# Patient Record
Sex: MALE | Race: WHITE | NOT HISPANIC OR LATINO | URBAN - METROPOLITAN AREA
[De-identification: names, ages, dates, MRNs, and addresses within clinical notes are randomized per-mention and may not be internally consistent; named-entity substitution may affect disease eponyms.]

---

## 2023-04-14 ENCOUNTER — TELEPHONE (OUTPATIENT)
Dept: PRIMARY CARE | Facility: CLINIC | Age: 68
End: 2023-04-14
Payer: COMMERCIAL

## 2023-04-14 NOTE — TELEPHONE ENCOUNTER
Pt. With an ingrown toenail and would like a referral to a podiatrist if possible or if you can see him before it gets bad if you can take care of it would an appt.

## 2023-04-17 DIAGNOSIS — L60.0 INGROWN TOENAIL: ICD-10-CM

## 2023-09-01 DIAGNOSIS — N52.9 ERECTILE DYSFUNCTION, UNSPECIFIED ERECTILE DYSFUNCTION TYPE: ICD-10-CM

## 2023-09-01 RX ORDER — SILDENAFIL CITRATE 20 MG/1
TABLET ORAL
Qty: 60 TABLET | Refills: 11 | Status: SHIPPED | OUTPATIENT
Start: 2023-09-01 | End: 2024-01-26 | Stop reason: ALTCHOICE

## 2023-09-01 RX ORDER — SILDENAFIL CITRATE 20 MG/1
TABLET ORAL
COMMUNITY
End: 2023-09-01 | Stop reason: SDUPTHER

## 2023-09-25 DIAGNOSIS — E78.5 HYPERLIPIDEMIA, UNSPECIFIED HYPERLIPIDEMIA TYPE: ICD-10-CM

## 2023-09-25 RX ORDER — EZETIMIBE 10 MG/1
10 TABLET ORAL DAILY
COMMUNITY
End: 2023-09-25 | Stop reason: SDUPTHER

## 2023-09-26 RX ORDER — EZETIMIBE 10 MG/1
10 TABLET ORAL DAILY
Qty: 90 TABLET | Refills: 3 | Status: SHIPPED | OUTPATIENT
Start: 2023-09-26 | End: 2023-10-19

## 2023-10-18 DIAGNOSIS — E78.5 HYPERLIPIDEMIA, UNSPECIFIED HYPERLIPIDEMIA TYPE: ICD-10-CM

## 2023-10-19 RX ORDER — EZETIMIBE 10 MG/1
10 TABLET ORAL DAILY
Qty: 90 TABLET | Refills: 3 | Status: SHIPPED | OUTPATIENT
Start: 2023-10-19

## 2023-11-08 ENCOUNTER — OFFICE VISIT (OUTPATIENT)
Dept: PRIMARY CARE | Facility: CLINIC | Age: 68
End: 2023-11-08
Payer: COMMERCIAL

## 2023-11-08 VITALS
HEIGHT: 70 IN | SYSTOLIC BLOOD PRESSURE: 130 MMHG | TEMPERATURE: 97.1 F | WEIGHT: 180 LBS | OXYGEN SATURATION: 97 % | HEART RATE: 67 BPM | BODY MASS INDEX: 25.77 KG/M2 | DIASTOLIC BLOOD PRESSURE: 80 MMHG

## 2023-11-08 DIAGNOSIS — L03.032 CELLULITIS OF GREAT TOE OF LEFT FOOT: Primary | ICD-10-CM

## 2023-11-08 PROBLEM — B35.1 ONYCHOMYCOSIS OF TOENAIL: Status: ACTIVE | Noted: 2023-11-08

## 2023-11-08 PROBLEM — D18.01 HEMANGIOMA OF SKIN AND SUBCUTANEOUS TISSUE: Status: ACTIVE | Noted: 2017-04-24

## 2023-11-08 PROBLEM — L81.4 OTHER MELANIN HYPERPIGMENTATION: Status: ACTIVE | Noted: 2017-04-24

## 2023-11-08 PROBLEM — D22.60 MELANOCYTIC NEVI OF UNSPECIFIED UPPER LIMB, INCLUDING SHOULDER: Status: ACTIVE | Noted: 2017-04-24

## 2023-11-08 PROBLEM — N13.8 BPH WITH OBSTRUCTION/LOWER URINARY TRACT SYMPTOMS: Status: ACTIVE | Noted: 2023-11-08

## 2023-11-08 PROBLEM — D22.30 MELANOCYTIC NEVI OF UNSPECIFIED PART OF FACE: Status: ACTIVE | Noted: 2017-04-24

## 2023-11-08 PROBLEM — D22.9 NUMEROUS MOLES: Status: ACTIVE | Noted: 2023-11-08

## 2023-11-08 PROBLEM — N40.1 NOCTURIA ASSOCIATED WITH BENIGN PROSTATIC HYPERPLASIA: Status: ACTIVE | Noted: 2023-11-08

## 2023-11-08 PROBLEM — N40.1 BPH WITH OBSTRUCTION/LOWER URINARY TRACT SYMPTOMS: Status: ACTIVE | Noted: 2023-11-08

## 2023-11-08 PROBLEM — L30.9 DERMATITIS, ECZEMATOID: Status: ACTIVE | Noted: 2023-11-08

## 2023-11-08 PROBLEM — D48.5 NEOPLASM OF UNCERTAIN BEHAVIOR OF SKIN: Status: ACTIVE | Noted: 2017-04-24

## 2023-11-08 PROBLEM — R97.20 ELEVATED PSA: Status: ACTIVE | Noted: 2023-11-08

## 2023-11-08 PROBLEM — R33.9 URINARY RETENTION WITH INCOMPLETE BLADDER EMPTYING: Status: ACTIVE | Noted: 2023-11-08

## 2023-11-08 PROBLEM — N52.9 ED (ERECTILE DYSFUNCTION): Status: ACTIVE | Noted: 2023-11-08

## 2023-11-08 PROBLEM — H93.8X2 PRESSURE SENSATION IN LEFT EAR: Status: ACTIVE | Noted: 2023-11-08

## 2023-11-08 PROBLEM — R10.2 PELVIC PAIN IN MALE: Status: ACTIVE | Noted: 2023-11-08

## 2023-11-08 PROBLEM — R35.1 NOCTURIA ASSOCIATED WITH BENIGN PROSTATIC HYPERPLASIA: Status: ACTIVE | Noted: 2023-11-08

## 2023-11-08 PROBLEM — E78.5 DYSLIPIDEMIA: Status: ACTIVE | Noted: 2023-11-08

## 2023-11-08 PROBLEM — L82.1 OTHER SEBORRHEIC KERATOSIS: Status: ACTIVE | Noted: 2017-04-24

## 2023-11-08 PROBLEM — D22.5 MELANOCYTIC NEVI OF TRUNK: Status: ACTIVE | Noted: 2017-04-24

## 2023-11-08 PROBLEM — L60.3 DYSTROPHIC NAIL: Status: ACTIVE | Noted: 2023-11-08

## 2023-11-08 PROBLEM — K40.90 RIGHT INGUINAL HERNIA: Status: ACTIVE | Noted: 2023-11-08

## 2023-11-08 PROCEDURE — 1159F MED LIST DOCD IN RCRD: CPT | Performed by: INTERNAL MEDICINE

## 2023-11-08 PROCEDURE — 99213 OFFICE O/P EST LOW 20 MIN: CPT | Performed by: INTERNAL MEDICINE

## 2023-11-08 PROCEDURE — 1126F AMNT PAIN NOTED NONE PRSNT: CPT | Performed by: INTERNAL MEDICINE

## 2023-11-08 PROCEDURE — 1160F RVW MEDS BY RX/DR IN RCRD: CPT | Performed by: INTERNAL MEDICINE

## 2023-11-08 RX ORDER — MULTIVITAMIN
1 TABLET ORAL DAILY
COMMUNITY
Start: 2021-01-15

## 2023-11-08 RX ORDER — SULFAMETHOXAZOLE AND TRIMETHOPRIM 800; 160 MG/1; MG/1
1 TABLET ORAL 2 TIMES DAILY
Qty: 14 TABLET | Refills: 0 | Status: SHIPPED | OUTPATIENT
Start: 2023-11-08 | End: 2023-11-15

## 2023-11-08 RX ORDER — TERBINAFINE HYDROCHLORIDE 250 MG/1
250 TABLET ORAL DAILY
COMMUNITY
End: 2024-01-26 | Stop reason: ALTCHOICE

## 2023-11-08 RX ORDER — BIOTIN 1 MG
TABLET ORAL
COMMUNITY
Start: 2021-01-15

## 2023-11-08 RX ORDER — ASPIRIN 81 MG/1
1 TABLET ORAL DAILY
COMMUNITY
Start: 2021-01-25

## 2023-11-08 ASSESSMENT — ENCOUNTER SYMPTOMS
STRIDOR: 0
CHOKING: 0
BACK PAIN: 0
HEADACHES: 0
DIFFICULTY URINATING: 0
SHORTNESS OF BREATH: 0
COUGH: 0
ABDOMINAL PAIN: 0
FACIAL ASYMMETRY: 0
EYE DISCHARGE: 0
NECK STIFFNESS: 0
RHINORRHEA: 0
CONFUSION: 0
COLOR CHANGE: 1
BLOOD IN STOOL: 0
FEVER: 0
SINUS PRESSURE: 0
POLYDIPSIA: 0
HALLUCINATIONS: 0
NERVOUS/ANXIOUS: 0
SPEECH DIFFICULTY: 0
VOICE CHANGE: 0
APPETITE CHANGE: 0
FREQUENCY: 0
WHEEZING: 0
ARTHRALGIAS: 0
SEIZURES: 0
TROUBLE SWALLOWING: 0
BRUISES/BLEEDS EASILY: 0
EYE REDNESS: 0
NAUSEA: 0
CHEST TIGHTNESS: 0
DIZZINESS: 0
PALPITATIONS: 0
FLANK PAIN: 0
CONSTIPATION: 0
ACTIVITY CHANGE: 0
WEAKNESS: 0
PHOTOPHOBIA: 0
NUMBNESS: 0
WOUND: 1
ABDOMINAL DISTENTION: 0
VOMITING: 0
DYSPHORIC MOOD: 0
DIARRHEA: 0
FATIGUE: 0
POLYPHAGIA: 0
DYSURIA: 0
SINUS PAIN: 0
MYALGIAS: 0

## 2023-11-08 ASSESSMENT — PAIN SCALES - GENERAL: PAINLEVEL: 0-NO PAIN

## 2023-11-08 NOTE — PROGRESS NOTES
Subjective   Patient ID: David Caban is a 68 y.o. male who presents for Allergic Reaction (Given amoxicillin for toe infection. C/o allergic reaction-rash across body. Took benadryl twice yesterday).    HPI   Patient recently had left foot Ingrown toe nail removed by Podiatrist and following it patient developed toe infection and given Amoxicillin. He developed allergic rash over the face and neck and its itchy. He was told to take Benadryl 50 mg TID and after 2nd dose he felt very dizzy and presented to the office today.    Review of Systems   Constitutional:  Negative for activity change, appetite change, fatigue and fever.   HENT:  Negative for congestion, dental problem, ear pain, hearing loss, rhinorrhea, sinus pressure, sinus pain, trouble swallowing and voice change.    Eyes:  Negative for photophobia, discharge, redness and visual disturbance.   Respiratory:  Negative for cough, choking, chest tightness, shortness of breath, wheezing and stridor.    Cardiovascular:  Negative for chest pain, palpitations and leg swelling.   Gastrointestinal:  Negative for abdominal distention, abdominal pain, blood in stool, constipation, diarrhea, nausea and vomiting.   Endocrine: Negative for polydipsia, polyphagia and polyuria.   Genitourinary:  Negative for difficulty urinating, dysuria, flank pain, frequency and urgency.   Musculoskeletal:  Negative for arthralgias, back pain, myalgias and neck stiffness.   Skin:  Positive for color change, rash and wound.   Allergic/Immunologic: Negative for immunocompromised state.   Neurological:  Negative for dizziness, seizures, syncope, facial asymmetry, speech difficulty, weakness, numbness and headaches.   Hematological:  Does not bruise/bleed easily.   Psychiatric/Behavioral:  Negative for behavioral problems, confusion, dysphoric mood, hallucinations and suicidal ideas. The patient is not nervous/anxious.      Objective   /80   Pulse 67   Temp 36.2 °C (97.1 °F)   Ht  "1.778 m (5' 10\")   Wt 81.6 kg (180 lb)   SpO2 97%   BMI 25.83 kg/m²     Physical Exam  Constitutional:       General: He is not in acute distress.     Appearance: Normal appearance. He is not ill-appearing or toxic-appearing.   Eyes:      Conjunctiva/sclera: Conjunctivae normal.   Cardiovascular:      Rate and Rhythm: Normal rate and regular rhythm.      Pulses: Normal pulses.      Heart sounds: Normal heart sounds. No murmur heard.  Pulmonary:      Effort: Pulmonary effort is normal.   Musculoskeletal:         General: Swelling and tenderness present. No deformity.      Cervical back: Normal range of motion.      Right lower leg: No edema.      Left lower leg: No edema.   Neurological:      General: No focal deficit present.      Mental Status: He is alert and oriented to person, place, and time.   Psychiatric:         Mood and Affect: Mood normal.         Behavior: Behavior normal.       Assessment/Plan   Problem List Items Addressed This Visit    None  Visit Diagnoses       Cellulitis of great toe of left foot    -  Primary    Relevant Medications    sulfamethoxazole-trimethoprim (Bactrim DS) 800-160 mg tablet        Bactrim has been prescribed but patient told to take Benadryl 25 mg every night for the duration of Bactrim and you can stop Amoxicillin. Patient has been informed that any medication can cause side effects and Bactrim can also cause similar symptoms but will try to switch antibiotic and try to control side effects with Benadryl.  "

## 2024-01-08 ENCOUNTER — TELEPHONE (OUTPATIENT)
Dept: PRIMARY CARE | Facility: CLINIC | Age: 69
End: 2024-01-08
Payer: COMMERCIAL

## 2024-01-08 NOTE — TELEPHONE ENCOUNTER
Per written response from MP I called in Paxlovid full dose and advised the pt to hold his statin and or amlodipine.

## 2024-01-08 NOTE — TELEPHONE ENCOUNTER
Pt called stated that he tested positive for covid last night his sx started on sat night with cough, congestion, headache, and body ache. No fever, no SOB, asking if paxlovid can be called in ?

## 2024-01-26 ENCOUNTER — OFFICE VISIT (OUTPATIENT)
Dept: PRIMARY CARE | Facility: CLINIC | Age: 69
End: 2024-01-26
Payer: COMMERCIAL

## 2024-01-26 ENCOUNTER — LAB (OUTPATIENT)
Dept: LAB | Facility: LAB | Age: 69
End: 2024-01-26
Payer: COMMERCIAL

## 2024-01-26 VITALS
SYSTOLIC BLOOD PRESSURE: 118 MMHG | HEART RATE: 74 BPM | DIASTOLIC BLOOD PRESSURE: 74 MMHG | OXYGEN SATURATION: 95 % | TEMPERATURE: 98 F | BODY MASS INDEX: 26.83 KG/M2 | HEIGHT: 70 IN | RESPIRATION RATE: 16 BRPM | WEIGHT: 187.4 LBS

## 2024-01-26 DIAGNOSIS — N40.1 NOCTURIA ASSOCIATED WITH BENIGN PROSTATIC HYPERPLASIA: ICD-10-CM

## 2024-01-26 DIAGNOSIS — N52.9 ERECTILE DYSFUNCTION, UNSPECIFIED ERECTILE DYSFUNCTION TYPE: ICD-10-CM

## 2024-01-26 DIAGNOSIS — Z78.9 NEVER SMOKED CIGARETTES: ICD-10-CM

## 2024-01-26 DIAGNOSIS — Z12.5 SPECIAL SCREENING FOR MALIGNANT NEOPLASM OF PROSTATE: ICD-10-CM

## 2024-01-26 DIAGNOSIS — N13.8 BPH WITH OBSTRUCTION/LOWER URINARY TRACT SYMPTOMS: ICD-10-CM

## 2024-01-26 DIAGNOSIS — R97.20 ELEVATED PSA: ICD-10-CM

## 2024-01-26 DIAGNOSIS — B35.1 ONYCHOMYCOSIS OF TOENAIL: ICD-10-CM

## 2024-01-26 DIAGNOSIS — N40.1 BPH WITH OBSTRUCTION/LOWER URINARY TRACT SYMPTOMS: ICD-10-CM

## 2024-01-26 DIAGNOSIS — E66.3 OVERWEIGHT WITH BODY MASS INDEX (BMI) OF 26 TO 26.9 IN ADULT: ICD-10-CM

## 2024-01-26 DIAGNOSIS — Z00.00 WELL ADULT EXAM: ICD-10-CM

## 2024-01-26 DIAGNOSIS — Z00.00 WELL ADULT EXAM: Primary | ICD-10-CM

## 2024-01-26 DIAGNOSIS — R35.1 NOCTURIA ASSOCIATED WITH BENIGN PROSTATIC HYPERPLASIA: ICD-10-CM

## 2024-01-26 DIAGNOSIS — E78.5 DYSLIPIDEMIA: ICD-10-CM

## 2024-01-26 PROBLEM — H93.8X2 PRESSURE SENSATION IN LEFT EAR: Status: RESOLVED | Noted: 2023-11-08 | Resolved: 2024-01-26

## 2024-01-26 LAB
ALBUMIN SERPL BCP-MCNC: 4.2 G/DL (ref 3.4–5)
ALP SERPL-CCNC: 77 U/L (ref 33–136)
ALT SERPL W P-5'-P-CCNC: 15 U/L (ref 10–52)
ANION GAP SERPL CALC-SCNC: 12 MMOL/L (ref 10–20)
AST SERPL W P-5'-P-CCNC: 17 U/L (ref 9–39)
BILIRUB SERPL-MCNC: 0.6 MG/DL (ref 0–1.2)
BUN SERPL-MCNC: 18 MG/DL (ref 6–23)
CALCIUM SERPL-MCNC: 9.4 MG/DL (ref 8.6–10.3)
CHLORIDE SERPL-SCNC: 106 MMOL/L (ref 98–107)
CHOLEST SERPL-MCNC: 144 MG/DL (ref 0–199)
CHOLESTEROL/HDL RATIO: 2.6
CO2 SERPL-SCNC: 26 MMOL/L (ref 21–32)
CREAT SERPL-MCNC: 0.85 MG/DL (ref 0.5–1.3)
EGFRCR SERPLBLD CKD-EPI 2021: >90 ML/MIN/1.73M*2
ERYTHROCYTE [DISTWIDTH] IN BLOOD BY AUTOMATED COUNT: 12.9 % (ref 11.5–14.5)
GLUCOSE SERPL-MCNC: 83 MG/DL (ref 74–99)
HCT VFR BLD AUTO: 40.9 % (ref 41–52)
HDLC SERPL-MCNC: 55.8 MG/DL
HGB BLD-MCNC: 13.6 G/DL (ref 13.5–17.5)
LDLC SERPL CALC-MCNC: 75 MG/DL
MCH RBC QN AUTO: 30.2 PG (ref 26–34)
MCHC RBC AUTO-ENTMCNC: 33.3 G/DL (ref 32–36)
MCV RBC AUTO: 91 FL (ref 80–100)
NON HDL CHOLESTEROL: 88 MG/DL (ref 0–149)
NRBC BLD-RTO: 0 /100 WBCS (ref 0–0)
PLATELET # BLD AUTO: 264 X10*3/UL (ref 150–450)
POTASSIUM SERPL-SCNC: 4.3 MMOL/L (ref 3.5–5.3)
PROT SERPL-MCNC: 6.6 G/DL (ref 6.4–8.2)
RBC # BLD AUTO: 4.51 X10*6/UL (ref 4.5–5.9)
SODIUM SERPL-SCNC: 140 MMOL/L (ref 136–145)
TRIGL SERPL-MCNC: 65 MG/DL (ref 0–149)
VLDL: 13 MG/DL (ref 0–40)
WBC # BLD AUTO: 5 X10*3/UL (ref 4.4–11.3)

## 2024-01-26 PROCEDURE — 80053 COMPREHEN METABOLIC PANEL: CPT

## 2024-01-26 PROCEDURE — 36415 COLL VENOUS BLD VENIPUNCTURE: CPT

## 2024-01-26 PROCEDURE — 99397 PER PM REEVAL EST PAT 65+ YR: CPT | Performed by: FAMILY MEDICINE

## 2024-01-26 PROCEDURE — 84153 ASSAY OF PSA TOTAL: CPT

## 2024-01-26 PROCEDURE — 1126F AMNT PAIN NOTED NONE PRSNT: CPT | Performed by: FAMILY MEDICINE

## 2024-01-26 PROCEDURE — 1036F TOBACCO NON-USER: CPT | Performed by: FAMILY MEDICINE

## 2024-01-26 PROCEDURE — 1159F MED LIST DOCD IN RCRD: CPT | Performed by: FAMILY MEDICINE

## 2024-01-26 PROCEDURE — 80061 LIPID PANEL: CPT

## 2024-01-26 PROCEDURE — 3008F BODY MASS INDEX DOCD: CPT | Performed by: FAMILY MEDICINE

## 2024-01-26 PROCEDURE — 85027 COMPLETE CBC AUTOMATED: CPT

## 2024-01-26 PROCEDURE — 1157F ADVNC CARE PLAN IN RCRD: CPT | Performed by: FAMILY MEDICINE

## 2024-01-26 RX ORDER — TADALAFIL 5 MG/1
5 TABLET ORAL DAILY
Qty: 90 TABLET | Refills: 3 | Status: SHIPPED | OUTPATIENT
Start: 2024-01-26 | End: 2025-01-25

## 2024-01-26 RX ORDER — TADALAFIL 5 MG/1
5 TABLET ORAL DAILY
Qty: 90 TABLET | Refills: 3 | Status: SHIPPED | OUTPATIENT
Start: 2024-01-26 | End: 2024-01-26 | Stop reason: SDUPTHER

## 2024-01-26 ASSESSMENT — ENCOUNTER SYMPTOMS
DYSURIA: 0
BLOOD IN STOOL: 0
BACK PAIN: 0
PALPITATIONS: 0
CHILLS: 0
LOSS OF SENSATION IN FEET: 0
EYE PAIN: 0
BRUISES/BLEEDS EASILY: 0
SHORTNESS OF BREATH: 0
ADENOPATHY: 0
HEMATURIA: 0
FEVER: 0
EYE REDNESS: 0
WEAKNESS: 0
COUGH: 0
ABDOMINAL PAIN: 0
OCCASIONAL FEELINGS OF UNSTEADINESS: 0
RHINORRHEA: 0
NECK STIFFNESS: 0
HALLUCINATIONS: 0
WOUND: 0
POLYDIPSIA: 0
HEADACHES: 0
DEPRESSION: 0
SORE THROAT: 0
FATIGUE: 0

## 2024-01-26 ASSESSMENT — PATIENT HEALTH QUESTIONNAIRE - PHQ9
2. FEELING DOWN, DEPRESSED OR HOPELESS: NOT AT ALL
1. LITTLE INTEREST OR PLEASURE IN DOING THINGS: NOT AT ALL
SUM OF ALL RESPONSES TO PHQ9 QUESTIONS 1 AND 2: 0

## 2024-01-26 NOTE — PROGRESS NOTES
David Caban is a 68 y.o. male with Chief Complaint of Annual Exam (CPE).  And recheck on HLD and hig psa.   Past Surgical History:   Procedure Laterality Date    OTHER SURGICAL HISTORY  07/07/2015    Wrist Surgery      Social History     Socioeconomic History    Marital status:      Spouse name: Not on file    Number of children: Not on file    Years of education: Not on file    Highest education level: Not on file   Occupational History    Not on file   Tobacco Use    Smoking status: Never     Passive exposure: Never    Smokeless tobacco: Never   Vaping Use    Vaping Use: Never used   Substance and Sexual Activity    Alcohol use: Yes     Comment: social    Drug use: Never    Sexual activity: Yes     Partners: Female   Other Topics Concern    Not on file   Social History Narrative    Not on file     Social Determinants of Health     Financial Resource Strain: Not on file   Food Insecurity: Not on file   Transportation Needs: Not on file   Physical Activity: Not on file   Stress: Not on file   Social Connections: Not on file   Intimate Partner Violence: Not on file   Housing Stability: Not on file     Past Medical History:   Diagnosis Date    Onychomycosis of toenail 11/08/2023    Other specified symptoms and signs involving the circulatory and respiratory systems     Carotid bruit    Personal history of other diseases of the musculoskeletal system and connective tissue 02/28/2018    History of back pain    Polyp of colon 07/18/2017    Sessile colonic polyp    Unspecified abdominal pain 02/28/2018    Acute left flank pain      No family history on file.     Review of Systems   Constitutional:  Negative for chills, fatigue and fever.   HENT:  Negative for rhinorrhea and sore throat.    Eyes:  Negative for pain and redness.   Respiratory:  Negative for cough and shortness of breath.    Cardiovascular:  Negative for chest pain and palpitations.   Gastrointestinal:  Negative for abdominal pain and blood in  "stool.   Endocrine: Negative for polydipsia and polyuria.   Genitourinary:  Negative for dysuria and hematuria.   Musculoskeletal:  Negative for back pain and neck stiffness.   Skin:  Negative for rash and wound.   Allergic/Immunologic: Negative for environmental allergies and food allergies.   Neurological:  Negative for weakness and headaches.   Hematological:  Negative for adenopathy. Does not bruise/bleed easily.   Psychiatric/Behavioral:  Negative for hallucinations and suicidal ideas.       /74 (BP Location: Left arm, Patient Position: Sitting, BP Cuff Size: Adult)   Pulse 74   Temp 36.7 °C (98 °F) (Temporal)   Resp 16   Ht 1.778 m (5' 10\")   Wt 85 kg (187 lb 6.4 oz)   SpO2 95%   BMI 26.89 kg/m²   Physical Exam  Vitals reviewed.   Constitutional:       General: He is not in acute distress.     Appearance: He is not ill-appearing.   HENT:      Head: Normocephalic and atraumatic.      Right Ear: Tympanic membrane normal.      Left Ear: Tympanic membrane normal.      Nose: No congestion or rhinorrhea.      Mouth/Throat:      Pharynx: No oropharyngeal exudate or posterior oropharyngeal erythema.   Eyes:      Extraocular Movements: Extraocular movements intact.      Conjunctiva/sclera: Conjunctivae normal.      Pupils: Pupils are equal, round, and reactive to light.   Cardiovascular:      Rate and Rhythm: Normal rate and regular rhythm.      Heart sounds: No murmur heard.     No friction rub. No gallop.   Pulmonary:      Effort: Pulmonary effort is normal.      Breath sounds: Normal breath sounds. No wheezing, rhonchi or rales.   Abdominal:      General: There is no distension.      Palpations: Abdomen is soft.      Tenderness: There is no abdominal tenderness. There is no guarding or rebound.   Musculoskeletal:         General: No swelling or deformity.      Cervical back: Normal range of motion and neck supple.      Right lower leg: No edema.      Left lower leg: No edema.   Skin:     Capillary " "Refill: Capillary refill takes less than 2 seconds.      Coloration: Skin is not jaundiced.      Findings: No rash.   Neurological:      General: No focal deficit present.      Mental Status: He is alert.      Motor: No weakness.   Psychiatric:         Mood and Affect: Mood normal.         Behavior: Behavior normal.       Lab Results   Component Value Date    WBC 4.1 (L) 01/25/2023    HGB 14.1 01/25/2023    HCT 42.4 01/25/2023    MCV 91 01/25/2023     01/25/2023     Lab Results   Component Value Date    CHOL 169 01/25/2023    CHOL 171 01/21/2022    CHOL 145 06/18/2021     Lab Results   Component Value Date    HDL 47.0 01/25/2023    HDL 50.7 01/21/2022    HDL 59.5 06/18/2021     No results found for: \"LDLCALC\"  Lab Results   Component Value Date    TRIG 197 (H) 01/21/2022    TRIG 50 01/10/2020     No components found for: \"CHOLHDL\"  No results found for: \"HGBA1C\"    Assessment/Plan   Problem List Items Addressed This Visit       BPH with obstruction/lower urinary tract symptoms    Current Assessment & Plan     Trial cialis 5 mg daily -- to help with ED and BPH symptoms.          Dyslipidemia    Overview     Johnyn's Risk 6.7% but CACS 42 (showing small plaque in LAD and LCx).          Current Assessment & Plan     Continue zetia. Did NOT tolerate statins.          ED (erectile dysfunction)    Current Assessment & Plan     Trial cialis 5 mg daily -- to help with ED and BPH symptoms.          Elevated PSA    Overview     negative MRI 2021 per Dr Kirby.             Current Assessment & Plan     PSA 4 2022.  Continue to monitor.          Nocturia associated with benign prostatic hyperplasia    Well adult exam - Primary    Overview     Preventative exam performed 1/26/24           Current Assessment & Plan     1/16/24 Preventative exam -- continue healthy diet and exercise. Check bloodwork.     Colonoscopy 2020 showed 4 and 8 mm polyps -- recheck due in 2025.   Tdap up to date -- next due 2025. Zostavax 7/18/17. "   Shingrix 2020 x 2.     Weight loss tips provided for BM > 25 c/w healthy physique with high lean muscle mass. Encourage regular exercise and healthy diet. Offer prevnar 20 1/2023. Feels good on chinese herbal medicine.    # NO carotid bruit -- referred vibratory hum from heart murmur.     # Hx 2/6 heart murmur -- likely venous hum -- normal stress echo 2008. No cardiac symptoms.    # Mild anemia on exam 7/2015 -- normal b12 and ferritin. Patient donates blood every 6 weeks and has never been refused due to low hematocrit.         RESOLVED: Onychomycosis of toenail    Current Assessment & Plan     completed trial of terbinafine 250 mg daily x 180 day.           Other Visit Diagnoses       Overweight with body mass index (BMI) of 26 to 26.9 in adult        Never smoked cigarettes        Special screening for malignant neoplasm of prostate

## 2024-01-27 LAB — PSA SERPL-MCNC: 3.68 NG/ML

## 2024-03-01 ENCOUNTER — APPOINTMENT (OUTPATIENT)
Dept: UROLOGY | Facility: CLINIC | Age: 69
End: 2024-03-01
Payer: COMMERCIAL

## 2024-03-19 ENCOUNTER — APPOINTMENT (OUTPATIENT)
Dept: UROLOGY | Facility: CLINIC | Age: 69
End: 2024-03-19
Payer: COMMERCIAL

## 2024-11-01 DIAGNOSIS — E78.5 HYPERLIPIDEMIA, UNSPECIFIED HYPERLIPIDEMIA TYPE: ICD-10-CM

## 2024-11-01 RX ORDER — EZETIMIBE 10 MG/1
10 TABLET ORAL DAILY
Qty: 30 TABLET | Refills: 11 | Status: SHIPPED | OUTPATIENT
Start: 2024-11-01

## 2025-02-21 DIAGNOSIS — N13.8 BPH WITH OBSTRUCTION/LOWER URINARY TRACT SYMPTOMS: ICD-10-CM

## 2025-02-21 DIAGNOSIS — N52.9 ERECTILE DYSFUNCTION, UNSPECIFIED ERECTILE DYSFUNCTION TYPE: ICD-10-CM

## 2025-02-21 DIAGNOSIS — N40.1 BPH WITH OBSTRUCTION/LOWER URINARY TRACT SYMPTOMS: ICD-10-CM

## 2025-02-24 RX ORDER — TADALAFIL 5 MG/1
5 TABLET ORAL DAILY
Qty: 90 TABLET | Refills: 3 | Status: SHIPPED | OUTPATIENT
Start: 2025-02-24 | End: 2026-02-24

## 2025-04-02 ENCOUNTER — APPOINTMENT (OUTPATIENT)
Dept: PRIMARY CARE | Facility: CLINIC | Age: 70
End: 2025-04-02
Payer: COMMERCIAL

## 2025-05-02 ENCOUNTER — HOSPITAL ENCOUNTER (EMERGENCY)
Facility: HOSPITAL | Age: 70
Discharge: HOME | End: 2025-05-02
Payer: COMMERCIAL

## 2025-05-02 VITALS
BODY MASS INDEX: 25.77 KG/M2 | HEART RATE: 66 BPM | TEMPERATURE: 98.6 F | RESPIRATION RATE: 16 BRPM | OXYGEN SATURATION: 96 % | DIASTOLIC BLOOD PRESSURE: 53 MMHG | SYSTOLIC BLOOD PRESSURE: 146 MMHG | HEIGHT: 70 IN | WEIGHT: 180 LBS

## 2025-05-02 DIAGNOSIS — K64.9 BLEEDING HEMORRHOID: Primary | ICD-10-CM

## 2025-05-02 PROCEDURE — 99282 EMERGENCY DEPT VISIT SF MDM: CPT

## 2025-05-02 ASSESSMENT — PAIN SCALES - GENERAL: PAINLEVEL_OUTOF10: 0 - NO PAIN

## 2025-05-02 ASSESSMENT — COLUMBIA-SUICIDE SEVERITY RATING SCALE - C-SSRS
1. IN THE PAST MONTH, HAVE YOU WISHED YOU WERE DEAD OR WISHED YOU COULD GO TO SLEEP AND NOT WAKE UP?: NO
2. HAVE YOU ACTUALLY HAD ANY THOUGHTS OF KILLING YOURSELF?: NO
6. HAVE YOU EVER DONE ANYTHING, STARTED TO DO ANYTHING, OR PREPARED TO DO ANYTHING TO END YOUR LIFE?: NO

## 2025-05-02 ASSESSMENT — PAIN - FUNCTIONAL ASSESSMENT: PAIN_FUNCTIONAL_ASSESSMENT: 0-10

## 2025-05-02 NOTE — ED PROVIDER NOTES
HPI   Chief Complaint   Patient presents with    Rectal Bleeding       Patient is a 69-year-old male with no significant PMH presents to the ED for rectal bleeding times today.  Patient states he had a soft bowel movement with no straining had some blood on his toilet paper which is not uncommon for him with his history of hemorrhoids.  Patient states he then got in the shower and there was a lot of rectal bleeding.  Patient denies any rectal pain denies any abdominal pain.  Denies any history of abdominal surgeries.  Patient states this is happened to him before but typically the bleeding is able to stop.  Patient had a colonoscopy 5 years ago that was within normal limits.  Patient denies any fever chills nausea vomiting diarrhea constipation.  Patient states he lifts a lot of heavy objects for work and will occasionally feel a bulge in his rectum when that happens.  Patient denies any chest pain shortness of breath dysuria.  Patient denies any history of ulcers denies any chronic NSAID or steroid use.  Patient is not on any blood thinners.  Patient denies any other injuries to the rectum.  Patient denies any tobacco alcohol or street drug abuse.              Patient History   Medical History[1]  Surgical History[2]  Family History[3]  Social History[4]    Physical Exam   ED Triage Vitals [05/02/25 1645]   Temperature Heart Rate Respirations BP   37 °C (98.6 °F) 66 16 146/53      Pulse Ox Temp Source Heart Rate Source Patient Position   96 % Temporal Monitor Sitting      BP Location FiO2 (%)     Left arm --       Physical Exam  HENT:      Head: Normocephalic.   Cardiovascular:      Pulses: Normal pulses.   Pulmonary:      Effort: Pulmonary effort is normal.   Abdominal:      Palpations: Abdomen is soft.      Tenderness: There is no abdominal tenderness. There is no guarding or rebound.   Genitourinary:     Comments: External hemorrhoid at the 9 o'clock position no obvious bleeding currently no lesions, internal  hemorrhoid palpated on exam no hard masses or other abnormalities on rectal exam.  Neurological:      Mental Status: He is alert.           ED Course & MDM   Diagnoses as of 05/02/25 1701   Bleeding hemorrhoid                 No data recorded     [place] Coma Scale Score: 15 (05/02/25 1645 : Rolando Giles, NALLELY)                           Medical Decision Making  Medical Decision Making:  Patient presented as described in HPI. Patient case including ROS, PE, and treatment and plan discussed with ED attending if attached as cosigner. Due to patients presentation orders completed include as documented.  Patient presents to the ED for rectal bleeding times today.  Patient states he had a soft bowel movement and then when he got in the shower he noticed bright red blood per rectum.  Patient does have history of hemorrhoids but this seemed to be more of a heavy bleed than typical.  Patient is nontoxic-appearing abdomen is soft and nontender rectum does have hemorrhoid no other abnormalities lesions fissures etc.  Patient educated this is likely bleeding from the hemorrhoid to follow-up with surgery.  Patient given referral.  Patient educated on sitz bath's and hemorrhoid cream.  Patient educated on any worsening symptoms including but not limited to abdominal pain nausea vomiting worsening rectal pain worsening bleeding to return.  Patient sissy stable and discharged.  Patient was advised to follow up with PCP or recommended provider in 2-3 days for another evaluation and exam. I advised patient/guardian to return or go to closest emergency room immediately if symptoms change, get worse, new symptoms develop prior to follow up. If there is no improvement in symptoms in the next 24 hours they are advised to return for further evaluation and exam. I also explained the plan and treatment course. Patient/guardian is in agreement with plan, treatment course, and follow up and states verbally that they will  comply.        Patient care discussed with: N/A  Social Determinants affecting care: N/A    Final diagnosis and disposition as below.  See CI    Homegoing. I discussed the differential; results and discharge plan with the patient and/or family/friend/caregiver if present.  I emphasized the importance of follow-up with the physician I referred them to in the timeframe recommended.  I explained reasons for the patient to return to the Emergency Department. They agreed that if they feel their condition is worsening or if they have any other concern they should call 911 immediately for further assistance. I gave the patient an opportunity to ask all questions they had and answered all of them accordingly. They understand return precautions and discharge instructions. The patient and/or family/friend/caregiver expressed understanding verbally and that they would comply.       Disposition:  Discharge        This note has been transcribed using voice recognition and may contain grammatical errors, misplaced words, incorrect words, incorrect phrases or other errors.          Procedure  Procedures       [1]   Past Medical History:  Diagnosis Date    Onychomycosis of toenail 11/08/2023    Other specified symptoms and signs involving the circulatory and respiratory systems     Carotid bruit    Personal history of other diseases of the musculoskeletal system and connective tissue 02/28/2018    History of back pain    Polyp of colon 07/18/2017    Sessile colonic polyp    Unspecified abdominal pain 02/28/2018    Acute left flank pain   [2]   Past Surgical History:  Procedure Laterality Date    OTHER SURGICAL HISTORY  07/07/2015    Wrist Surgery   [3] No family history on file.  [4]   Social History  Tobacco Use    Smoking status: Never     Passive exposure: Never    Smokeless tobacco: Never   Vaping Use    Vaping status: Never Used   Substance Use Topics    Alcohol use: Yes     Comment: social    Drug use: Never        Melvina  KRISTEN Fontenot  05/02/25 3111

## 2025-05-02 NOTE — DISCHARGE INSTRUCTIONS
Any worsening bleeding, Abdominal pain, Nausea vomiting return to the ER. Use Sitz baths, hemorrhoid cream for hemorrhoids

## 2025-05-02 NOTE — ED TRIAGE NOTES
Pt BIB POV from home w/ c/o rectal bleeding, bright red. Noticed in the shower. H/o hemorrhoids but this was profuse per pt. Stopped currently. Denies abdominal pain/rectal pain/n/v. Ambulatory, speaking in full sentences. Oriented.

## 2025-05-09 ENCOUNTER — APPOINTMENT (OUTPATIENT)
Dept: SURGERY | Facility: CLINIC | Age: 70
End: 2025-05-09
Payer: COMMERCIAL

## 2025-05-09 VITALS
OXYGEN SATURATION: 94 % | WEIGHT: 185 LBS | SYSTOLIC BLOOD PRESSURE: 133 MMHG | BODY MASS INDEX: 26.48 KG/M2 | HEART RATE: 71 BPM | DIASTOLIC BLOOD PRESSURE: 73 MMHG | HEIGHT: 70 IN

## 2025-05-09 DIAGNOSIS — Z12.11 SCREEN FOR COLON CANCER: Primary | ICD-10-CM

## 2025-05-09 DIAGNOSIS — K64.9 BLEEDING HEMORRHOID: ICD-10-CM

## 2025-05-09 NOTE — PROGRESS NOTES
General Surgery History and Physical    Referring Provider:  MD Po Adam Genevieve, P*     Chief Complaint:  Bleeding internal hemorrhoids    History of Present Illness:  This is a 69 y.o. male who presents with bleeding internal hemorrhoids.  He last had a colonoscopy 4.5 years ago where internal hemorrhoids and a couple of polyps were found.  He has had many years of issues with bright red hemorrhoid bleeding.  He denies any significant pain around the anus ever.  He denies any bulges around the anus ever.  He recently had blood running down his leg from his anus, and went to the emergency department.  Fortunately, at that time, he had already stopped he does have normal soft bowel movements with a high-fiber diet, but does not take fiber supplements or sitz bath's.  He denies ever having any intervention on his hemorrhoids in the past.    Past Medical History:  Hyperlipidemia    Past Surgical History:  Wrist surgery  Colonoscopy    Medications:  Statin    Allergies:  Amoxicillin    Family History:  Patient denies any known family history    Social History:  .  Works as a [employer].  Denies tobacco and illicits.  Drinks 1 alcoholic beverage a week.    Review of Systems:  A complete 12 point review of systems was performed and is negative except as noted in the history of present illness.      Vital Signs:  Vitals:    05/09/25 0916   BP: 133/73   Pulse: 71   SpO2: 94%          Physical Exam:  General: No acute distress. Sitting up in bed.   Neuro: Alert and oriented ×3. Follows commands.  Head: Atraumatic  Eyes: Pupils equal reactive to light. Extraocular motions intact.  Ears: Hears normal speaking voice.  Mouth, Nose, Throat: Mucous membranes moist.  Normal dentition.  Neck: Supple. No appreciable masses.  Chest: No crepitus.  No appreciable scars.  Heart: Regular rate and rhythm.  Lung: Clear to auscultation bilaterally.  Vascular: No carotid bruits.  Palpable radial pulses  bilaterally.  Abdomen: Soft. Nondistended. Nontender.  No appreciable hernias or scars.  Rectal: Tiny external hemorrhoid tissue in the right anterior column.  Otherwise normal exterior anal tissue.  Musculoskeletal: Moves all extremities.  Normal range of motion.  Lymphatic: No palpable lymph nodes.  Skin: No rashes or lesions.  Psychological: Normal affect      Laboratory Values:  CBC:   Lab Results   Component Value Date    WBC 5.0 01/26/2024    RBC 4.51 01/26/2024    HGB 13.6 01/26/2024    HCT 40.9 (L) 01/26/2024     01/26/2024       RFP:   Lab Results   Component Value Date     01/26/2024    K 4.3 01/26/2024     01/26/2024    CO2 26 01/26/2024    BUN 18 01/26/2024    CREATININE 0.85 01/26/2024    CALCIUM 9.4 01/26/2024        LFTs:   Lab Results   Component Value Date    PROT 6.6 01/26/2024    ALBUMIN 4.2 01/26/2024    BILITOT 0.6 01/26/2024    ALKPHOS 77 01/26/2024    AST 17 01/26/2024    ALT 15 01/26/2024            Imaging:    None      Assessment:  This is a 69 y.o. male who presents with bleeding internal hemorrhoids.  We discussed that he is due for colonoscopy this year.  We discussed that I therefore recommend fiber supplements, sitz bath's, and then a colonoscopy with concurrent hemorrhoid energy therapy (HET).      Plan:  -- We will proceed with a colonoscopy with HET.  -- The patient will take Miralax split bowel prep the night prior to the procedure.  -- The patient will be on a clear liquid diet the entire day prior to the procedure.      Tobi Perez MD  General Surgery  Office: [phone]  Fax:     [phone]  9:28 AM   05/09/25

## 2025-06-05 ENCOUNTER — TELEPHONE (OUTPATIENT)
Dept: PREADMISSION TESTING | Facility: HOSPITAL | Age: 70
End: 2025-06-05

## 2025-06-05 DIAGNOSIS — N40.1 BPH WITH OBSTRUCTION/LOWER URINARY TRACT SYMPTOMS: ICD-10-CM

## 2025-06-05 DIAGNOSIS — N52.9 ERECTILE DYSFUNCTION, UNSPECIFIED ERECTILE DYSFUNCTION TYPE: ICD-10-CM

## 2025-06-05 DIAGNOSIS — N13.8 BPH WITH OBSTRUCTION/LOWER URINARY TRACT SYMPTOMS: ICD-10-CM

## 2025-06-05 RX ORDER — TADALAFIL 5 MG/1
5 TABLET ORAL DAILY
Qty: 90 TABLET | Refills: 3 | Status: CANCELLED | OUTPATIENT
Start: 2025-06-05 | End: 2026-06-05

## 2025-06-05 RX ORDER — UBIDECARENONE 30 MG
30 CAPSULE ORAL DAILY
COMMUNITY

## 2025-06-05 NOTE — TELEPHONE ENCOUNTER
SURGERY PRE-OPERATIVE INSTRUCTIONS    *You will receive a phone call the day before your procedure  after 2pm, (or the Friday before your surgery if scheduled on a Monday.) Generally the hospital will be calling you with this information after that time.    *If you are taking GLP1 medications for type 2 diabetes or weight loss, hold these medications on the day of the procedure. START a clear liquid diet 24 hours before your surgery. On the day of your surgery/procedure, STOP all clear liquids 2 hours before your arrival time to the hospital. A clear liquid diet consists of clear liquids and foods that melt into a clear liquid. Clear liquids can and should contain sugar. This is only if you are taking a GLP1 medication.     *You are not to eat after midnight the night before the surgery. You may have up to 10 ounces of clear liquids up until 2 hours prior to arriving to the hospital. The exception is with medications you were instructed to take day of surgery.     *You may take tylenol for pain/discomfort as needed.     *Stop taking all aspirin products, ibuprofen (motrin/advil), naproxen (aleve/naprosyn) for one week prior to surgery.    *Stop taking all vitamins and supplements one week prior to surgery.     *You should not have alcoholic beverages for 24 hours before surgery.     *You should not smoke 24 hours prior to surgery.     *To help prevent surgical infections bathe/shower with Dial soap the evening before surgery.    *You can wear deodorant but no lotion, powder, or perfume/cologne. You should remove all make-up and nail polish at home.    *If you wear glasses, please bring a case for the glasses with you.    *You will be asked to remove dentures and contacts.     *Please leave all valuables at home.    *You should wear loose, comfortable clothing that will accommodate bandages and/or casts.    *You should notify your doctor of any change in your condition (fever, cold, rash, etc). Surgery may need to be  re-scheduled until a time you are in better health.    *A responsible adult is required to accompany you to and from the hospital if you are receiving anesthesia or a sedative. Patients are not permitted to drive for 24 hours after anesthesia.     *You can use the [employer] parking if you wish.     *If you have any further questions please call [phone].

## 2025-06-06 ENCOUNTER — ANESTHESIA EVENT (OUTPATIENT)
Dept: OPERATING ROOM | Facility: HOSPITAL | Age: 70
End: 2025-06-06
Payer: COMMERCIAL

## 2025-06-06 RX ORDER — DROPERIDOL 2.5 MG/ML
0.62 INJECTION, SOLUTION INTRAMUSCULAR; INTRAVENOUS ONCE AS NEEDED
Status: CANCELLED | OUTPATIENT
Start: 2025-06-06

## 2025-06-06 RX ORDER — ONDANSETRON HYDROCHLORIDE 2 MG/ML
4 INJECTION, SOLUTION INTRAVENOUS ONCE AS NEEDED
Status: CANCELLED | OUTPATIENT
Start: 2025-06-06

## 2025-06-06 RX ORDER — SODIUM CHLORIDE, SODIUM LACTATE, POTASSIUM CHLORIDE, CALCIUM CHLORIDE 600; 310; 30; 20 MG/100ML; MG/100ML; MG/100ML; MG/100ML
20 INJECTION, SOLUTION INTRAVENOUS CONTINUOUS
Status: CANCELLED | OUTPATIENT
Start: 2025-06-06 | End: 2025-06-07

## 2025-06-09 ENCOUNTER — ANESTHESIA (OUTPATIENT)
Dept: OPERATING ROOM | Facility: HOSPITAL | Age: 70
End: 2025-06-09
Payer: COMMERCIAL

## 2025-06-09 ENCOUNTER — HOSPITAL ENCOUNTER (OUTPATIENT)
Dept: OPERATING ROOM | Facility: HOSPITAL | Age: 70
Setting detail: OUTPATIENT SURGERY
Discharge: HOME | End: 2025-06-09
Payer: COMMERCIAL

## 2025-06-09 VITALS
HEART RATE: 60 BPM | TEMPERATURE: 96.8 F | WEIGHT: 178.57 LBS | HEIGHT: 70 IN | OXYGEN SATURATION: 98 % | BODY MASS INDEX: 25.56 KG/M2 | SYSTOLIC BLOOD PRESSURE: 122 MMHG | DIASTOLIC BLOOD PRESSURE: 76 MMHG | RESPIRATION RATE: 18 BRPM

## 2025-06-09 DIAGNOSIS — Z12.11 SCREEN FOR COLON CANCER: ICD-10-CM

## 2025-06-09 PROCEDURE — 3700000002 HC GENERAL ANESTHESIA TIME - EACH INCREMENTAL 1 MINUTE: Performed by: STUDENT IN AN ORGANIZED HEALTH CARE EDUCATION/TRAINING PROGRAM

## 2025-06-09 PROCEDURE — 7100000009 HC PHASE TWO TIME - INITIAL BASE CHARGE: Performed by: STUDENT IN AN ORGANIZED HEALTH CARE EDUCATION/TRAINING PROGRAM

## 2025-06-09 PROCEDURE — 45380 COLONOSCOPY AND BIOPSY: CPT | Performed by: SURGERY

## 2025-06-09 PROCEDURE — 2720000007 HC OR 272 NO HCPCS: Performed by: STUDENT IN AN ORGANIZED HEALTH CARE EDUCATION/TRAINING PROGRAM

## 2025-06-09 PROCEDURE — 7100000010 HC PHASE TWO TIME - EACH INCREMENTAL 1 MINUTE: Performed by: STUDENT IN AN ORGANIZED HEALTH CARE EDUCATION/TRAINING PROGRAM

## 2025-06-09 PROCEDURE — A45380 PR COLONOSCOPY,BIOPSY: Performed by: NURSE ANESTHETIST, CERTIFIED REGISTERED

## 2025-06-09 PROCEDURE — 2500000004 HC RX 250 GENERAL PHARMACY W/ HCPCS (ALT 636 FOR OP/ED): Performed by: NURSE ANESTHETIST, CERTIFIED REGISTERED

## 2025-06-09 PROCEDURE — A45380 PR COLONOSCOPY,BIOPSY: Performed by: STUDENT IN AN ORGANIZED HEALTH CARE EDUCATION/TRAINING PROGRAM

## 2025-06-09 PROCEDURE — 45388 COLONOSCOPY W/ABLATION: CPT | Performed by: SURGERY

## 2025-06-09 PROCEDURE — 3600000007 HC OR TIME - EACH INCREMENTAL 1 MINUTE - PROCEDURE LEVEL TWO: Performed by: STUDENT IN AN ORGANIZED HEALTH CARE EDUCATION/TRAINING PROGRAM

## 2025-06-09 PROCEDURE — 3600000002 HC OR TIME - INITIAL BASE CHARGE - PROCEDURE LEVEL TWO: Performed by: STUDENT IN AN ORGANIZED HEALTH CARE EDUCATION/TRAINING PROGRAM

## 2025-06-09 PROCEDURE — 3700000001 HC GENERAL ANESTHESIA TIME - INITIAL BASE CHARGE: Performed by: STUDENT IN AN ORGANIZED HEALTH CARE EDUCATION/TRAINING PROGRAM

## 2025-06-09 PROCEDURE — 2500000005 HC RX 250 GENERAL PHARMACY W/O HCPCS: Performed by: SURGERY

## 2025-06-09 PROCEDURE — 46930 DESTROY INTERNAL HEMORRHOIDS: CPT | Performed by: SURGERY

## 2025-06-09 RX ORDER — MIDAZOLAM HYDROCHLORIDE 1 MG/ML
INJECTION, SOLUTION INTRAMUSCULAR; INTRAVENOUS AS NEEDED
Status: DISCONTINUED | OUTPATIENT
Start: 2025-06-09 | End: 2025-06-09

## 2025-06-09 RX ORDER — PROPOFOL 10 MG/ML
INJECTION, EMULSION INTRAVENOUS AS NEEDED
Status: DISCONTINUED | OUTPATIENT
Start: 2025-06-09 | End: 2025-06-09

## 2025-06-09 RX ORDER — LIDOCAINE HCL/PF 100 MG/5ML
SYRINGE (ML) INTRAVENOUS AS NEEDED
Status: DISCONTINUED | OUTPATIENT
Start: 2025-06-09 | End: 2025-06-09

## 2025-06-09 RX ORDER — LIDOCAINE HYDROCHLORIDE 20 MG/ML
JELLY TOPICAL AS NEEDED
Status: COMPLETED | OUTPATIENT
Start: 2025-06-09 | End: 2025-06-09

## 2025-06-09 RX ADMIN — LIDOCAINE HYDROCHLORIDE 1 APPLICATION: 20 JELLY TOPICAL at 13:10

## 2025-06-09 RX ADMIN — PROPOFOL 50 MG: 10 INJECTION, EMULSION INTRAVENOUS at 12:42

## 2025-06-09 RX ADMIN — MIDAZOLAM 2 MG: 1 INJECTION INTRAMUSCULAR; INTRAVENOUS at 12:41

## 2025-06-09 RX ADMIN — PROPOFOL 75 MCG/KG/MIN: 10 INJECTION, EMULSION INTRAVENOUS at 12:43

## 2025-06-09 RX ADMIN — SODIUM CHLORIDE, POTASSIUM CHLORIDE, SODIUM LACTATE AND CALCIUM CHLORIDE: 600; 310; 30; 20 INJECTION, SOLUTION INTRAVENOUS at 12:41

## 2025-06-09 RX ADMIN — LIDOCAINE HYDROCHLORIDE 100 MG: 20 INJECTION INTRAVENOUS at 12:42

## 2025-06-09 RX ADMIN — PROPOFOL 10 MG: 10 INJECTION, EMULSION INTRAVENOUS at 13:06

## 2025-06-09 ASSESSMENT — PAIN SCALES - GENERAL
PAINLEVEL_OUTOF10: 0 - NO PAIN
PAINLEVEL_OUTOF10: 0 - NO PAIN

## 2025-06-09 ASSESSMENT — PAIN - FUNCTIONAL ASSESSMENT: PAIN_FUNCTIONAL_ASSESSMENT: 0-10

## 2025-06-09 NOTE — H&P
"General Surgery History and Physical    Referring Provider:  MD Chris Adam, Kevin SHERMAN MD     Chief Complaint:  Colonoscopy    History of Present Illness:  This is a 69 y.o. male who presents for a colonoscopy.    Past Medical History:  Medical History[1]     Past Surgical History:  Surgical History[2]     Medications:  Current Outpatient Medications   Medication Instructions    aspirin 81 mg EC tablet 1 tablet, Daily    biotin 1 mg tablet Take by mouth.    co-enzyme Q-10 30 mg, Daily    ezetimibe (ZETIA) 10 mg, oral, Daily    multivitamin tablet 1 tablet, Daily    tadalafil (CIALIS) 5 mg, oral, Daily        Allergies:  RX Allergies[3]     Family History:  Family History[4]     Social History:  Social History[5]     Review of Systems:  A complete 12 point review of systems was performed and is negative except as noted in the history of present illness.      Vital Signs:  Vitals:    06/09/25 1126   BP: 148/89   Pulse: 60   Resp: 16   Temp: 36.5 °C (97.7 °F)   SpO2: 98%          Physical Exam:  General: No acute distress. Sitting up in bed.   Neuro: Alert and oriented ×3. Follows commands.  Head: Atraumatic  Eyes: Pupils equal reactive to light. Extraocular motions intact.  Ears: Hears normal speaking voice.  Mouth, Nose, Throat: Mucous membranes moist.  Normal dentition.  Neck: Supple. No appreciable masses.  Chest: No crepitus.    Heart: Regular rate and rhythm.  Lung: Clear to auscultation bilaterally.  Vascular: No carotid bruits.  Palpable radial pulses bilaterally.  Abdomen: Soft. Nondistended. Nontender.    Musculoskeletal: Moves all extremities.  Normal range of motion.  Lymphatic: No palpable lymph nodes.  Skin: No rashes or lesions.  Psychological: Normal affect      Laboratory Values:  CBC: No results found for: \"WBC\", \"RBC\", \"HGB\", \"HCT\", \"PLT\"    RFP: No results found for: \"GLUF\", \"NA\", \"K\", \"CL\", \"CO2\", \"BUN\", \"CREATININE\", \"CALCIUM\", \"MG\", \"PHOS\"     LFTs: No results found for: " "\"PROT\", \"ALBUMIN\", \"BILITOT\", \"BILIDIR\", \"ALKPHOS\", \"AST\", \"ALT\"         Assessment:  This is a 69 y.o. male who presents for a colonoscopy.      Plan:  -- Colonoscopy.      Tobi Perez MD  General Surgery  Office: [phone]  Fax:     [phone]  12:26 PM   06/09/25          [1]   Past Medical History:  Diagnosis Date    Acute left flank pain 02/28/2018    Bleeding hemorrhoid     Carotid bruit     History of back pain 02/28/2018    Hyperlipidemia     Onychomycosis of toenail 11/08/2023    Polyp of colon 07/18/2017    Sessile colonic polyp   [2]   Past Surgical History:  Procedure Laterality Date    COLONOSCOPY      muliple    WRIST SURGERY Right     torn ligaments from fall   [3]   Allergies  Allergen Reactions    Amoxicillin Rash   [4] No family history on file.  [5]   Social History  Socioeconomic History    Marital status:    Tobacco Use    Smoking status: Never     Passive exposure: Never    Smokeless tobacco: Never   Vaping Use    Vaping status: Never Used   Substance and Sexual Activity    Alcohol use: Yes     Comment: social    Drug use: Never    Sexual activity: Yes     Partners: Female     "

## 2025-06-09 NOTE — DISCHARGE INSTRUCTIONS
Patient Instructions after a Colonoscopy      The anesthetics, sedatives or narcotics which were given to you today will be acting in your body for the next 24 hours, so you might feel a little sleepy or groggy.  This feeling should slowly wear off. Carefully read and follow the instructions.     You received sedation today:  - Do not drive or operate any machinery or power tools of any kind.   - No alcoholic beverages today, not even beer or wine.  - Do not make any important decisions or sign any legal documents.  - No over the counter medications that contain alcohol or that may cause drowsiness.  - Do not make any important decisions or sign any legal documents.  - Make sure you have someone with you for first 24 hours.    While it is common to experience mild to moderate abdominal distention, gas, or belching after your procedure, if any of these symptoms occur following discharge from the GI Lab or within one week of having your procedure, call the [facility] to be advised whether a visit to your nearest Urgent Care or Emergency Department is indicated.  Take this paper with you if you go.     - If you develop an allergic reaction to the medications that were given during your procedure such as difficulty breathing, rash, hives, severe nausea, vomiting or lightheadedness.  - If you experience chest pain, shortness of breath, severe abdominal pain, fevers and chills.  -If you develop signs and symptoms of bleeding such as blood in your spit, if your stools turn black, tarry, or bloody  - If you have not urinated within 8 hours following your procedure.  - If your IV site becomes painful, red, inflamed, or looks infected.    If you received a biopsy/polypectomy/sphincterotomy the following instructions apply below:    __ Do not use Aspirin containing products, non-steroidal medications or anti-coagulants for one week following your procedure. (Examples of these types of medications are: Advil,  Arthrotec, Aleve, Coumadin, Ecotrin, Heparin, Ibuprofen, Indocin, Motrin, Naprosyn, Nuprin, Plavix, Vioxx, and Voltarin, or their generic forms.  This list is not all-inclusive.  Check with your physician or pharmacist before resuming medications.)   __ Eat a soft diet today.  Avoid foods that are poorly digested for the next 24 hours.  These foods would include: nuts, beans, lettuce, red meats, and fried foods. Start with liquids and advance your diet as tolerated, gradually work up to eating solids.   __ Do not have a Barium Study or Enema for one week.    Your physician recommends the additional following instructions:    -You have a contact number available for emergencies. The signs and symptoms of potential delayed complications were discussed with you. You may return to normal activities tomorrow.  -Resume your previous diet.  -Continue your present medications.   -We are waiting for your pathology results.  -Your physician has recommended a repeat colonoscopy (date to be determined after pending pathology results are reviewed) for surveillance based on pathology results.  -The findings and recommendations have been discussed with you.  -The findings and recommendations were discussed with your family.  - Please see Medication Reconciliation Form for new medication/medications prescribed.       If you experience any problems or have any questions following discharge from the GI Lab, please call:        Nurse Signature                                                                        Date___________________                                                                            Patient/Responsible Party Signature                                        Date___________________

## 2025-06-17 NOTE — ANESTHESIA POSTPROCEDURE EVALUATION
Patient: David Caban    Procedure Summary       Date: 06/09/25 Room / Location: [facility] OR    Anesthesia Start: 1241 Anesthesia Stop: 1321    Procedure: COLONOSCOPY Diagnosis: Screen for colon cancer    Scheduled Providers: Kevin Perez MD; Anthony Miller DO Responsible Provider: Anthony Miller DO    Anesthesia Type: MAC ASA Status: 2            Anesthesia Type: MAC    Vitals Value Taken Time   /76 06/09/25 13:31   Temp 36 °C (96.8 °F) 06/09/25 13:16   Pulse 60 06/09/25 13:31   Resp 18 06/09/25 13:31   SpO2 98 % 06/09/25 13:31       Anesthesia Post Evaluation    Patient location during evaluation: PACU  Patient participation: complete - patient participated  Level of consciousness: awake and alert  Pain management: adequate  Airway patency: patent  Cardiovascular status: acceptable  Respiratory status: acceptable  Hydration status: acceptable  Postoperative Nausea and Vomiting: none        No notable events documented.

## 2025-06-17 NOTE — ANESTHESIA PREPROCEDURE EVALUATION
Patient: David Caban    Procedure Information       Anesthesia Start Date/Time: 06/09/25 1241    Scheduled providers: Kevin Perez MD; Anthony Miller DO    Procedure: COLONOSCOPY    Location: [facility] OR            Relevant Problems   /Renal   (+) BPH with obstruction/lower urinary tract symptoms      Skin   (+) Dermatitis, eczematoid       Clinical information reviewed:   Tobacco  Allergies  Meds   Med Hx  Surg Hx   Fam Hx  Soc Hx        NPO Detail:  No data recorded     Physical Exam    Airway  Mallampati: II  TM distance: >3 FB  Neck ROM: full  Mouth opening: 3 or more finger widths     Cardiovascular - normal exam   Dental    Pulmonary - normal exam   Abdominal            Anesthesia Plan    History of general anesthesia?: yes  History of complications of general anesthesia?: no    ASA 2     MAC     intravenous induction   Anesthetic plan and risks discussed with patient.  Use of blood products discussed with patient who.    Plan discussed with CRNA.

## 2025-06-18 ENCOUNTER — APPOINTMENT (OUTPATIENT)
Dept: SURGERY | Facility: CLINIC | Age: 70
End: 2025-06-18
Payer: COMMERCIAL

## 2025-06-18 DIAGNOSIS — K64.9 BLEEDING HEMORRHOID: Primary | ICD-10-CM

## 2025-06-18 DIAGNOSIS — Z09 POSTOPERATIVE EXAMINATION: ICD-10-CM

## 2025-06-18 PROCEDURE — 1036F TOBACCO NON-USER: CPT | Performed by: PHYSICIAN ASSISTANT

## 2025-06-18 PROCEDURE — 1160F RVW MEDS BY RX/DR IN RCRD: CPT | Performed by: PHYSICIAN ASSISTANT

## 2025-06-18 PROCEDURE — 99024 POSTOP FOLLOW-UP VISIT: CPT | Performed by: PHYSICIAN ASSISTANT

## 2025-06-18 PROCEDURE — 1159F MED LIST DOCD IN RCRD: CPT | Performed by: PHYSICIAN ASSISTANT

## 2025-06-18 RX ORDER — LIDOCAINE 50 MG/G
OINTMENT TOPICAL AS NEEDED
Qty: 35 G | Refills: 2 | Status: SHIPPED | OUTPATIENT
Start: 2025-06-18 | End: 2025-10-16

## 2025-06-18 RX ORDER — DIAPER,BRIEF,INFANT-TODD,DISP
EACH MISCELLANEOUS 2 TIMES DAILY
Qty: 28.4 G | Refills: 2 | Status: SHIPPED | OUTPATIENT
Start: 2025-06-18

## 2025-06-18 NOTE — PROGRESS NOTES
General Surgery Post Operative Follow Up     Subjective   David Caban presents to the clinic after having a colonoscopy with HET for internal hemorrhoids on 6/9/25. Patient was doing well, back to taking benefiber and having normal bowel movements a few days after the procedure. However, last Thursday he noticed a large protuding nodule from the anus with severe tenderness. He was able to reduce the protruding hemorrhoid, although it would almost immediate pop back out. Patient has been doing sitz baths and OTC HC cream with some relief, however it is still quite tender. Denies blood in the stool. Denies firm bowel movements, the stool has mostly been a pudding soft consistency. The tenderness is somewhat improved compared to last Thursday after doing the sitz and cream.    Objective   There were no vitals taken for this visit.    Physical Exam  Constitutional: No acute distress, sitting up in bed.  Neuro: Alert, oriented. Follows commands.   Eyes: EOMI. No scleral icterus. Conjunctiva pink.  ENT: MMM.   Heart: Regular rate.  Respiratory: No increased work of breathing or audible wheeze.  Abdomen: Soft, nondistended.   Rectal: left sided pink, swollen and tender external hemorrhoid. No thrombosed discoloration. Reduces easily. No blood.  MSK: Moves all extremities.  Vascular: Palpable pulses throughout, no pitting edema.  Skin: No rashes.   Psychological: Appropriate mood and behavior.       Assessment/Plan   This is a 69 y.o. year old male who presents for a post operative follow up 1 weeks following a colonoscopy with HET for internal hemorrhoids. He was healing well and back to normal bowel movements, although has now developed a symptomatic external hemorrhoid first noticed last Thursday.  Discussed options including aggressive medical management with hopes that the inflammation will resolve. Other option would be surgical hemorrhoidectomy. Reviewed recovery process that is typically painful. Patient and I  agree we will try aggressive conservative care over the next week or two and if there is no improvement, we can schedule for hemorrhoidectomy.    Plan:  -- Sitz baths with warm water up to 4 times daily  -- Hydrocortisone cream externally  -- Lidocaine cream PRN for symptom relief  -- Keep stools pudding consistency, avoid firm stools until healed.  -- Regular diet, continue fiber supplementation  -- Call in one week to update with symptom resolution. If not improving, patient may require surgical hemorrhoidectomy          Joanna Jett PA-C

## 2025-06-19 LAB
LABORATORY COMMENT REPORT: NORMAL
PATH REPORT.FINAL DX SPEC: NORMAL
PATH REPORT.GROSS SPEC: NORMAL
PATH REPORT.RELEVANT HX SPEC: NORMAL
PATH REPORT.TOTAL CANCER: NORMAL

## 2025-07-02 ENCOUNTER — APPOINTMENT (OUTPATIENT)
Dept: PRIMARY CARE | Facility: CLINIC | Age: 70
End: 2025-07-02
Payer: COMMERCIAL

## 2025-07-02 VITALS
BODY MASS INDEX: 26.2 KG/M2 | WEIGHT: 183 LBS | HEART RATE: 71 BPM | SYSTOLIC BLOOD PRESSURE: 121 MMHG | OXYGEN SATURATION: 94 % | DIASTOLIC BLOOD PRESSURE: 78 MMHG | TEMPERATURE: 98 F | HEIGHT: 70 IN

## 2025-07-02 DIAGNOSIS — Z00.00 WELL ADULT EXAM: ICD-10-CM

## 2025-07-02 DIAGNOSIS — E78.5 DYSLIPIDEMIA: Primary | ICD-10-CM

## 2025-07-02 DIAGNOSIS — Z23 NEED FOR DIPHTHERIA-TETANUS-PERTUSSIS (TDAP) VACCINE: ICD-10-CM

## 2025-07-02 DIAGNOSIS — N40.1 BPH WITH OBSTRUCTION/LOWER URINARY TRACT SYMPTOMS: ICD-10-CM

## 2025-07-02 DIAGNOSIS — Z12.5 SCREENING FOR PROSTATE CANCER: ICD-10-CM

## 2025-07-02 DIAGNOSIS — K40.90 RIGHT INGUINAL HERNIA: ICD-10-CM

## 2025-07-02 DIAGNOSIS — N52.9 ERECTILE DYSFUNCTION, UNSPECIFIED ERECTILE DYSFUNCTION TYPE: ICD-10-CM

## 2025-07-02 DIAGNOSIS — N13.8 BPH WITH OBSTRUCTION/LOWER URINARY TRACT SYMPTOMS: ICD-10-CM

## 2025-07-02 PROBLEM — D22.60 MELANOCYTIC NEVI OF UNSPECIFIED UPPER LIMB, INCLUDING SHOULDER: Status: RESOLVED | Noted: 2017-04-24 | Resolved: 2025-07-02

## 2025-07-02 PROBLEM — D48.5 NEOPLASM OF UNCERTAIN BEHAVIOR OF SKIN: Status: RESOLVED | Noted: 2017-04-24 | Resolved: 2025-07-02

## 2025-07-02 PROBLEM — D22.5 MELANOCYTIC NEVI OF TRUNK: Status: RESOLVED | Noted: 2017-04-24 | Resolved: 2025-07-02

## 2025-07-02 PROBLEM — D18.01 HEMANGIOMA OF SKIN AND SUBCUTANEOUS TISSUE: Status: RESOLVED | Noted: 2017-04-24 | Resolved: 2025-07-02

## 2025-07-02 PROBLEM — D22.30 MELANOCYTIC NEVI OF UNSPECIFIED PART OF FACE: Status: RESOLVED | Noted: 2017-04-24 | Resolved: 2025-07-02

## 2025-07-02 PROBLEM — L60.3 DYSTROPHIC NAIL: Status: RESOLVED | Noted: 2023-11-08 | Resolved: 2025-07-02

## 2025-07-02 PROBLEM — L30.9 DERMATITIS, ECZEMATOID: Status: RESOLVED | Noted: 2023-11-08 | Resolved: 2025-07-02

## 2025-07-02 PROBLEM — L81.4 OTHER MELANIN HYPERPIGMENTATION: Status: RESOLVED | Noted: 2017-04-24 | Resolved: 2025-07-02

## 2025-07-02 PROCEDURE — 1160F RVW MEDS BY RX/DR IN RCRD: CPT | Performed by: FAMILY MEDICINE

## 2025-07-02 PROCEDURE — 1036F TOBACCO NON-USER: CPT | Performed by: FAMILY MEDICINE

## 2025-07-02 PROCEDURE — 90715 TDAP VACCINE 7 YRS/> IM: CPT | Performed by: FAMILY MEDICINE

## 2025-07-02 PROCEDURE — 90471 IMMUNIZATION ADMIN: CPT | Performed by: FAMILY MEDICINE

## 2025-07-02 PROCEDURE — 99397 PER PM REEVAL EST PAT 65+ YR: CPT | Performed by: FAMILY MEDICINE

## 2025-07-02 PROCEDURE — 1159F MED LIST DOCD IN RCRD: CPT | Performed by: FAMILY MEDICINE

## 2025-07-02 PROCEDURE — 3008F BODY MASS INDEX DOCD: CPT | Performed by: FAMILY MEDICINE

## 2025-07-02 ASSESSMENT — ENCOUNTER SYMPTOMS
SORE THROAT: 0
PALPITATIONS: 0
HEADACHES: 0
NECK STIFFNESS: 0
EYE PAIN: 0
COUGH: 0
ADENOPATHY: 0
POLYDIPSIA: 0
DYSURIA: 0
SHORTNESS OF BREATH: 0
ABDOMINAL PAIN: 0
WOUND: 0
WEAKNESS: 0
RHINORRHEA: 0
BLOOD IN STOOL: 0
EYE REDNESS: 0
HALLUCINATIONS: 0
BRUISES/BLEEDS EASILY: 0
CHILLS: 0
FATIGUE: 0
BACK PAIN: 0
HEMATURIA: 0
FEVER: 0

## 2025-07-02 ASSESSMENT — PROMIS GLOBAL HEALTH SCALE
EMOTIONAL_PROBLEMS: NEVER
RATE_AVERAGE_PAIN: 1
RATE_MENTAL_HEALTH: VERY GOOD
RATE_QUALITY_OF_LIFE: EXCELLENT
CARRYOUT_PHYSICAL_ACTIVITIES: COMPLETELY
CARRYOUT_SOCIAL_ACTIVITIES: VERY GOOD
RATE_SOCIAL_SATISFACTION: EXCELLENT
RATE_PHYSICAL_HEALTH: VERY GOOD
RATE_GENERAL_HEALTH: VERY GOOD

## 2025-07-02 NOTE — PROGRESS NOTES
David Caban is a 69 y.o. male with Chief Complaint of Annual Exam.    And recheck on HLD and hig psa.     Past Surgical History:   Procedure Laterality Date    COLONOSCOPY      muliple    WRIST SURGERY Right     torn ligaments from fall      Social History     Socioeconomic History    Marital status:      Spouse name: Not on file    Number of children: Not on file    Years of education: Not on file    Highest education level: Not on file   Occupational History    Not on file   Tobacco Use    Smoking status: Never     Passive exposure: Never    Smokeless tobacco: Never   Vaping Use    Vaping status: Never Used   Substance and Sexual Activity    Alcohol use: Yes     Comment: social    Drug use: Never    Sexual activity: Yes     Partners: Female   Other Topics Concern    Not on file   Social History Narrative    Not on file     Social Drivers of Health     Financial Resource Strain: Not on file   Food Insecurity: Not on file   Transportation Needs: Not on file   Physical Activity: Not on file   Stress: Not on file   Social Connections: Not on file   Intimate Partner Violence: Not on file   Housing Stability: Not on file     Past Medical History:   Diagnosis Date    Acute left flank pain 02/28/2018    Bleeding hemorrhoid     Carotid bruit     History of back pain 02/28/2018    Hyperlipidemia     Onychomycosis of toenail 11/08/2023    Polyp of colon 07/18/2017    Sessile colonic polyp      No family history on file.     Review of Systems   Constitutional:  Negative for chills, fatigue and fever.   HENT:  Negative for rhinorrhea and sore throat.    Eyes:  Negative for pain and redness.   Respiratory:  Negative for cough and shortness of breath.    Cardiovascular:  Negative for chest pain and palpitations.   Gastrointestinal:  Negative for abdominal pain and blood in stool.   Endocrine: Negative for polydipsia and polyuria.   Genitourinary:  Negative for dysuria and hematuria.   Musculoskeletal:  Negative for back  "pain and neck stiffness.   Skin:  Negative for rash and wound.   Allergic/Immunologic: Negative for environmental allergies and food allergies.   Neurological:  Negative for weakness and headaches.   Hematological:  Negative for adenopathy. Does not bruise/bleed easily.   Psychiatric/Behavioral:  Negative for hallucinations and suicidal ideas.       /78   Pulse 71   Temp 36.7 °C (98 °F)   Ht 1.778 m (5' 10\")   Wt 83 kg (183 lb)   SpO2 94%   BMI 26.26 kg/m²     Physical Exam  Vitals reviewed.   Constitutional:       General: He is not in acute distress.     Appearance: He is not ill-appearing.   HENT:      Head: Normocephalic and atraumatic.      Right Ear: Tympanic membrane normal.      Left Ear: Tympanic membrane normal.      Nose: No congestion or rhinorrhea.      Mouth/Throat:      Pharynx: No oropharyngeal exudate or posterior oropharyngeal erythema.   Eyes:      Extraocular Movements: Extraocular movements intact.      Conjunctiva/sclera: Conjunctivae normal.      Pupils: Pupils are equal, round, and reactive to light.   Cardiovascular:      Rate and Rhythm: Normal rate and regular rhythm.      Heart sounds: No murmur heard.     No friction rub. No gallop.   Pulmonary:      Effort: Pulmonary effort is normal.      Breath sounds: Normal breath sounds. No wheezing, rhonchi or rales.   Abdominal:      General: There is no distension.      Palpations: Abdomen is soft.      Tenderness: There is no abdominal tenderness. There is no guarding or rebound.   Musculoskeletal:         General: No swelling or deformity.      Cervical back: Normal range of motion and neck supple.      Right lower leg: No edema.      Left lower leg: No edema.   Skin:     Capillary Refill: Capillary refill takes less than 2 seconds.      Coloration: Skin is not jaundiced.      Findings: No rash.   Neurological:      General: No focal deficit present.      Mental Status: He is alert.      Motor: No weakness.   Psychiatric:         " "Mood and Affect: Mood normal.         Behavior: Behavior normal.       Lab Results   Component Value Date    WBC 5.0 01/26/2024    HGB 13.6 01/26/2024    HCT 40.9 (L) 01/26/2024    MCV 91 01/26/2024     01/26/2024     Lab Results   Component Value Date    CHOL 144 01/26/2024    CHOL 169 01/25/2023    CHOL 171 01/21/2022     Lab Results   Component Value Date    HDL 55.8 01/26/2024    HDL 47.0 01/25/2023    HDL 50.7 01/21/2022     Lab Results   Component Value Date    LDLCALC 75 01/26/2024     Lab Results   Component Value Date    TRIG 65 01/26/2024    TRIG 197 (H) 01/21/2022    TRIG 50 01/10/2020     No components found for: \"CHOLHDL\"  No results found for: \"HGBA1C\"    Assessment/Plan   Problem List Items Addressed This Visit       BPH with obstruction/lower urinary tract symptoms    Current Assessment & Plan   No benefit from cialis 5 mg daily -- still using as needed for ED.           Dyslipidemia - Primary    Overview   Johnny's Risk 6.7% but CACS 42 (showing small plaque in LAD and LCx).          Current Assessment & Plan   Stable, Continue zetia.   Did NOT tolerate statins.          ED (erectile dysfunction)    Current Assessment & Plan   Doing well with prn cialis.          Right inguinal hernia    Current Assessment & Plan   Stable, asymptomatic -- no need for surgery for now.          Well adult exam    Overview   7/2/25 Preventative exam performed          Current Assessment & Plan   7/2/25 Preventative exam -- continue healthy diet and exercise. Check bloodwork.     5/2/25 ER bleeding hemorrhoids  6/19/25 COLONOSCOPY + polyp, IH treated with HET cautery..  Recheck 5 years 2030 (bohac)    Tdap up to date -- given 7/2/25  Zostavax 7/18/17.   Shingrix 2020 x 2.   prevnar 20 1/2023.    Weight loss tips provided for BM > 25 c/w healthy physique with high lean muscle mass. Encourage regular exercise and healthy diet.   Stopped chinese herbal medicine.    # NO carotid bruit -- referred vibratory hum from " heart murmur.  # Hx 2/6 heart murmur -- likely venous hum -- normal stress echo 2008. No cardiac symptoms.   # Mild anemia on exam 7/2015 -- normal b12 and ferritin. Patient donates blood every 6 weeks and has never been refused due to low hematocrit.         Relevant Orders    Comprehensive metabolic panel    CBC    Lipid Panel Non-Fasting     Other Visit Diagnoses         Need for diphtheria-tetanus-pertussis (Tdap) vaccine        Relevant Orders    Tdap vaccine, age 7 years and older      Screening for prostate cancer        Relevant Orders    Prostate Specific Antigen, Screen

## 2025-07-02 NOTE — ASSESSMENT & PLAN NOTE
7/2/25 Preventative exam -- continue healthy diet and exercise. Check bloodwork.     5/2/25 ER bleeding hemorrhoids  6/19/25 COLONOSCOPY + polyp, IH treated with HET cautery..  Recheck 5 years 2030 (bohac)    Tdap up to date -- given 7/2/25  Zostavax 7/18/17.   Shingrix 2020 x 2.   prevnar 20 1/2023.    Weight loss tips provided for BM > 25 c/w healthy physique with high lean muscle mass. Encourage regular exercise and healthy diet.   Stopped chinese herbal medicine.    # NO carotid bruit -- referred vibratory hum from heart murmur.  # Hx 2/6 heart murmur -- likely venous hum -- normal stress echo 2008. No cardiac symptoms.   # Mild anemia on exam 7/2015 -- normal b12 and ferritin. Patient donates blood every 6 weeks and has never been refused due to low hematocrit.

## 2025-07-03 LAB
ALBUMIN SERPL-MCNC: 4.4 G/DL (ref 3.6–5.1)
ALP SERPL-CCNC: 71 U/L (ref 35–144)
ALT SERPL-CCNC: 16 U/L (ref 9–46)
ANION GAP SERPL CALCULATED.4IONS-SCNC: 9 MMOL/L (CALC) (ref 7–17)
AST SERPL-CCNC: 19 U/L (ref 10–35)
BILIRUB SERPL-MCNC: 0.6 MG/DL (ref 0.2–1.2)
BUN SERPL-MCNC: 22 MG/DL (ref 7–25)
CALCIUM SERPL-MCNC: 10.4 MG/DL (ref 8.6–10.3)
CHLORIDE SERPL-SCNC: 107 MMOL/L (ref 98–110)
CHOLEST SERPL-MCNC: 195 MG/DL
CHOLEST/HDLC SERPL: 3.1 (CALC)
CO2 SERPL-SCNC: 24 MMOL/L (ref 20–32)
CREAT SERPL-MCNC: 0.94 MG/DL (ref 0.7–1.35)
EGFRCR SERPLBLD CKD-EPI 2021: 88 ML/MIN/1.73M2
ERYTHROCYTE [DISTWIDTH] IN BLOOD BY AUTOMATED COUNT: 13.8 % (ref 11–15)
GLUCOSE SERPL-MCNC: 85 MG/DL (ref 65–139)
HCT VFR BLD AUTO: 42.3 % (ref 38.5–50)
HDLC SERPL-MCNC: 63 MG/DL
HGB BLD-MCNC: 13.4 G/DL (ref 13.2–17.1)
LDLC SERPL CALC-MCNC: 110 MG/DL (CALC)
MCH RBC QN AUTO: 30.5 PG (ref 27–33)
MCHC RBC AUTO-ENTMCNC: 31.7 G/DL (ref 32–36)
MCV RBC AUTO: 96.1 FL (ref 80–100)
NONHDLC SERPL-MCNC: 132 MG/DL (CALC)
PLATELET # BLD AUTO: 242 THOUSAND/UL (ref 140–400)
PMV BLD REES-ECKER: 10.7 FL (ref 7.5–12.5)
POTASSIUM SERPL-SCNC: 4.8 MMOL/L (ref 3.5–5.3)
PROT SERPL-MCNC: 6.8 G/DL (ref 6.1–8.1)
PSA SERPL-MCNC: 3.51 NG/ML
RBC # BLD AUTO: 4.4 MILLION/UL (ref 4.2–5.8)
SODIUM SERPL-SCNC: 140 MMOL/L (ref 135–146)
TRIGL SERPL-MCNC: 117 MG/DL
WBC # BLD AUTO: 4.9 THOUSAND/UL (ref 3.8–10.8)

## 2025-07-16 ENCOUNTER — APPOINTMENT (OUTPATIENT)
Dept: UROLOGY | Facility: CLINIC | Age: 70
End: 2025-07-16
Payer: COMMERCIAL

## 2025-07-16 DIAGNOSIS — R97.20 ELEVATED PSA: ICD-10-CM

## 2025-07-16 DIAGNOSIS — N40.1 BPH WITH OBSTRUCTION/LOWER URINARY TRACT SYMPTOMS: Primary | ICD-10-CM

## 2025-07-16 DIAGNOSIS — N13.8 BPH WITH OBSTRUCTION/LOWER URINARY TRACT SYMPTOMS: Primary | ICD-10-CM

## 2025-07-16 DIAGNOSIS — N52.9 ERECTILE DYSFUNCTION, UNSPECIFIED ERECTILE DYSFUNCTION TYPE: ICD-10-CM

## 2025-07-16 LAB
POC APPEARANCE, URINE: CLEAR
POC BILIRUBIN, URINE: NEGATIVE
POC BLOOD, URINE: NEGATIVE
POC COLOR, URINE: YELLOW
POC GLUCOSE, URINE: NEGATIVE MG/DL
POC KETONES, URINE: NEGATIVE MG/DL
POC LEUKOCYTES, URINE: NEGATIVE
POC NITRITE,URINE: NEGATIVE
POC PH, URINE: 7 PH
POC PROTEIN, URINE: NEGATIVE MG/DL
POC SPECIFIC GRAVITY, URINE: 1.02
POC UROBILINOGEN, URINE: 0.2 EU/DL

## 2025-07-16 PROCEDURE — 1126F AMNT PAIN NOTED NONE PRSNT: CPT | Performed by: UROLOGY

## 2025-07-16 PROCEDURE — 1036F TOBACCO NON-USER: CPT | Performed by: UROLOGY

## 2025-07-16 PROCEDURE — 81003 URINALYSIS AUTO W/O SCOPE: CPT | Performed by: UROLOGY

## 2025-07-16 PROCEDURE — 1159F MED LIST DOCD IN RCRD: CPT | Performed by: UROLOGY

## 2025-07-16 PROCEDURE — 99204 OFFICE O/P NEW MOD 45 MIN: CPT | Performed by: UROLOGY

## 2025-07-16 PROCEDURE — 51798 US URINE CAPACITY MEASURE: CPT | Performed by: UROLOGY

## 2025-07-16 RX ORDER — TADALAFIL 5 MG/1
5 TABLET ORAL DAILY
Qty: 90 TABLET | Refills: 3 | Status: SHIPPED | OUTPATIENT
Start: 2025-07-16 | End: 2026-07-16

## 2025-07-16 ASSESSMENT — PAIN SCALES - GENERAL: PAINLEVEL_OUTOF10: 0-NO PAIN

## 2025-07-16 NOTE — PROGRESS NOTES
Subjective   David Caban is a 69 y.o. male presenting as a new patient today, last seen by me in 2022. Patient has history of BPH with LUTS, elevated PSA with negative MRI on 2/19/2021  and ED. Last PSA was 3.51 on 7/2/2025. He has nocturia x1 and occasional urinary frequency which are not bothersome. He is currently taking Cialis 5 mg PRN for LUTS and ED. Denies any recent gross hematuria, fevers, chills, urinary retention, intractable flank or abdominal pain, nausea or vomiting.              Medical History[1]  Surgical History[2]  Family History[3]  Current Medications[4]  Allergies[5]  Social History     Socioeconomic History    Marital status:      Spouse name: Not on file    Number of children: Not on file    Years of education: Not on file    Highest education level: Not on file   Occupational History    Not on file   Tobacco Use    Smoking status: Never     Passive exposure: Never    Smokeless tobacco: Never   Vaping Use    Vaping status: Never Used   Substance and Sexual Activity    Alcohol use: Yes     Comment: social    Drug use: Never    Sexual activity: Yes     Partners: Female   Other Topics Concern    Not on file   Social History Narrative    Not on file     Social Drivers of Health     Financial Resource Strain: Not on file   Food Insecurity: Not on file   Transportation Needs: Not on file   Physical Activity: Not on file   Stress: Not on file   Social Connections: Not on file   Intimate Partner Violence: Not on file   Housing Stability: Not on file       Review of Systems  Pertinent items are noted in HPI.    Objective       Lab Review  Lab Results   Component Value Date    WBC 4.9 07/02/2025    RBC 4.40 07/02/2025    HGB 13.4 07/02/2025    HCT 42.3 07/02/2025     07/02/2025      Lab Results   Component Value Date    BUN 22 07/02/2025    CREATININE 0.94 07/02/2025      Lab Results   Component Value Date    PSA 3.51 07/02/2025     Lab Results   Component Value Date    PSA 3.51  07/02/2025    PSA 3.68 01/26/2024    PSA 3.91 05/05/2022       IPSS: 6 and 1  PVR: 111 ml      Urine analysis shows negative     Assessment/Plan   Diagnoses and all orders for this visit:  Urinary symptom or sign  -     Measure post void residual  -     POCT UA Automated manually resulted    History of elevated PSA     I personally reviewed the patients labs; PSA was 3.51 on 7/2/2025.    Patient's PSA has been stable.     We will continue to monitor with PSA once a year.     BPH with LUTS     Patient's urinary symptoms are not bothersome however, his PVR is 111 ml which has worsened.     We will start him on daily Cialis 5 mg.     Follow up in 6 months for PVR check.    ED - good response with Cialis 5 mg, will refill.       All questions were answered to the patient's satisfaction. Patient agrees with the plan and wishes to proceed. Follow-up will be scheduled appropriately.     IYanira am scribing for, and in the presence of Dr Kirby.      I, Dr Kirby, personally performed the services described in the documentation as scribed by Yanira Hutson in my presence, and confirm it is both accurate and complete.         [1]   Past Medical History:  Diagnosis Date    Acute left flank pain 02/28/2018    Bleeding hemorrhoid     Carotid bruit     History of back pain 02/28/2018    Hyperlipidemia     Onychomycosis of toenail 11/08/2023    Polyp of colon 07/18/2017    Sessile colonic polyp   [2]   Past Surgical History:  Procedure Laterality Date    COLONOSCOPY      muliple    WRIST SURGERY Right     torn ligaments from fall   [3] No family history on file.  [4]   Current Outpatient Medications   Medication Sig Dispense Refill    aspirin 81 mg EC tablet Take 1 tablet (81 mg) by mouth once daily.      biotin 1 mg tablet Take by mouth.      co-enzyme Q-10 30 mg capsule Take 1 capsule (30 mg) by mouth once daily.      ezetimibe (Zetia) 10 mg tablet TAKE 1 TABLET BY MOUTH ONCE DAILY 30 tablet 11    multivitamin tablet Take 1  tablet by mouth once daily.      tadalafil (Cialis) 5 mg tablet Take 1 tablet (5 mg) by mouth once daily. (Patient taking differently: Take 1 tablet (5 mg) by mouth once daily as needed.) 90 tablet 3     No current facility-administered medications for this visit.   [5]   Allergies  Allergen Reactions    Amoxicillin Rash

## 2026-01-13 ENCOUNTER — APPOINTMENT (OUTPATIENT)
Dept: UROLOGY | Facility: CLINIC | Age: 71
End: 2026-01-13
Payer: COMMERCIAL

## 2026-07-08 ENCOUNTER — APPOINTMENT (OUTPATIENT)
Dept: PRIMARY CARE | Facility: CLINIC | Age: 71
End: 2026-07-08
Payer: COMMERCIAL